# Patient Record
Sex: MALE | Race: WHITE
[De-identification: names, ages, dates, MRNs, and addresses within clinical notes are randomized per-mention and may not be internally consistent; named-entity substitution may affect disease eponyms.]

---

## 2018-02-05 ENCOUNTER — HOSPITAL ENCOUNTER (INPATIENT)
Dept: HOSPITAL 12 - SRC | Age: 23
LOS: 6 days | Discharge: OTHER INSTITUTION | DRG: 895 | End: 2018-02-11
Attending: INTERNAL MEDICINE | Admitting: INTERNAL MEDICINE
Payer: COMMERCIAL

## 2018-02-05 VITALS — HEIGHT: 68 IN | BODY MASS INDEX: 22.73 KG/M2 | WEIGHT: 150 LBS

## 2018-02-05 DIAGNOSIS — Z81.1: ICD-10-CM

## 2018-02-05 DIAGNOSIS — Z83.2: ICD-10-CM

## 2018-02-05 DIAGNOSIS — F10.230: Primary | ICD-10-CM
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
## 2018-02-05 DIAGNOSIS — F13.10: ICD-10-CM

## 2018-02-05 DIAGNOSIS — Z82.49: ICD-10-CM

## 2018-02-05 DIAGNOSIS — F41.9: ICD-10-CM

## 2018-02-05 DIAGNOSIS — F17.210: ICD-10-CM

## 2018-02-05 DIAGNOSIS — D58.0: ICD-10-CM

## 2018-02-05 DIAGNOSIS — R17: ICD-10-CM

## 2018-02-05 DIAGNOSIS — I15.9: ICD-10-CM

## 2018-02-05 DIAGNOSIS — R73.9: ICD-10-CM

## 2018-02-05 DIAGNOSIS — F14.10: ICD-10-CM

## 2018-02-05 PROCEDURE — G0480 DRUG TEST DEF 1-7 CLASSES: HCPCS

## 2018-02-06 VITALS — SYSTOLIC BLOOD PRESSURE: 148 MMHG | DIASTOLIC BLOOD PRESSURE: 71 MMHG

## 2018-02-06 VITALS — DIASTOLIC BLOOD PRESSURE: 65 MMHG | SYSTOLIC BLOOD PRESSURE: 138 MMHG

## 2018-02-06 VITALS — SYSTOLIC BLOOD PRESSURE: 132 MMHG | DIASTOLIC BLOOD PRESSURE: 74 MMHG

## 2018-02-06 LAB
ALP SERPL-CCNC: 65 U/L (ref 50–136)
ALT SERPL W/O P-5'-P-CCNC: 35 U/L (ref 16–63)
AMPHETAMINES UR QL SCN>1000 NG/ML: NEGATIVE
AMYLASE SERPL-CCNC: 54 U/L (ref 25–115)
AST SERPL-CCNC: 27 U/L (ref 15–37)
BARBITURATES UR QL SCN: NEGATIVE
BASOPHILS # BLD AUTO: 0.1 K/UL (ref 0–8)
BASOPHILS NFR BLD AUTO: 0.8 % (ref 0–2)
BILIRUB SERPL-MCNC: 2.6 MG/DL (ref 0.2–1)
BUN SERPL-MCNC: 14 MG/DL (ref 7–18)
CHLORIDE SERPL-SCNC: 99 MMOL/L (ref 98–107)
CO2 SERPL-SCNC: 28 MMOL/L (ref 21–32)
COCAINE UR QL SCN: POSITIVE
CREAT SERPL-MCNC: 1.1 MG/DL (ref 0.6–1.3)
EOSINOPHIL # BLD AUTO: 0.1 K/UL (ref 0–0.7)
EOSINOPHIL NFR BLD AUTO: 1 % (ref 0–7)
ETHANOL SERPL-MCNC: < 3 MG/DL (ref 0–0)
GLUCOSE SERPL-MCNC: 122 MG/DL (ref 74–106)
HCT VFR BLD AUTO: 45.6 % (ref 36.7–47.1)
HGB BLD-MCNC: 17 G/DL (ref 12.5–16.3)
LYMPHOCYTES # BLD AUTO: 1.5 K/UL (ref 20–40)
LYMPHOCYTES NFR BLD AUTO: 18.1 % (ref 20.5–51.5)
MAGNESIUM SERPL-MCNC: 2.1 MG/DL (ref 1.8–2.4)
MCV RBC AUTO: 96.9 FL (ref 73–96.2)
MONOCYTES # BLD AUTO: 0.5 K/UL (ref 2–10)
MONOCYTES NFR BLD AUTO: 6.2 % (ref 0–11)
NEUTROPHILS # BLD AUTO: 5.9 K/UL (ref 1.8–8.9)
NEUTROPHILS NFR BLD AUTO: 73.9 % (ref 38.5–71.5)
OPIATES UR QL SCN: NEGATIVE
PCP UR QL SCN>25 NG/ML: NEGATIVE
PLATELET # BLD AUTO: 219 K/UL (ref 152–348)
POTASSIUM SERPL-SCNC: 3.8 MMOL/L (ref 3.5–5.1)
RBC # BLD AUTO: 4.7 MIL/UL (ref 4.06–5.63)
THC UR QL SCN>50 NG/ML: POSITIVE
WBC # BLD AUTO: 8 K/UL (ref 3.6–10.2)
WS STN SPEC: 8.2 G/DL (ref 6.4–8.2)

## 2018-02-06 PROCEDURE — HZ41ZZZ GROUP COUNSELING FOR SUBSTANCE ABUSE TREATMENT, BEHAVIORAL: ICD-10-PCS

## 2018-02-06 PROCEDURE — HZ2ZZZZ DETOXIFICATION SERVICES FOR SUBSTANCE ABUSE TREATMENT: ICD-10-PCS | Performed by: INTERNAL MEDICINE

## 2018-02-06 RX ADMIN — LORAZEPAM SCH MG: 1 TABLET ORAL at 20:16

## 2018-02-06 RX ADMIN — LORAZEPAM SCH MG: 1 TABLET ORAL at 16:33

## 2018-02-06 NOTE — NUR
END OF SHIFT NOTE

Patient is alert oriented x4. 22 year old male admitted for ETOH dependence. Patient has PMH 
of Anxiety, seizure, and hereditary spherocytosis. Patient cont on 5 days Ativan taper 
tolerating well. Medications were effective in reducing withdrawal symptoms. Patient did not 
receive any PRN during shift. Skin intact warm and dry to touch. Last CIWA score was 10. 
Patient remained compliant with treatment plan and medication regime. Encourage Po fluids as 
tolerated. All safety measures in place, Call light within reach. Patient endorsed to night 
nurse in stable condition.

## 2018-02-06 NOTE — NUR
ADMISSION NOTE

STATUS-FULL CODE

ALLERGY-SULFA

HEIGHT-5'8"

WEIGHT-150IBS

VITAL SIGNS-BP: 138/65, P: 80, R: 16, O2: 96%, T: 98.4, PA: 0/10

PCP-NONE



Patient is a 22 year old  male admitted on 02/06/18 for ETOH dependence, arrived on the unit 
at 1500. Patient able to provide UDS during intake. Skin and body check completed. Pt is 
full code, Regular diet and on fall/seizure precautions. Patient reports PMH of 
anxiety,heredity spherocytosis, seizure last seizure was in May 2017. Pt refuses 
flu/pneumonia vaccine. Patient reports smokes 1 pack of cigarettes a day. Patient did not 
bring any medication with him. Patient reports first using alcohol at age 11 years old, but 
last 2 years drinking daily 12 pack of beer, last used was on 02/05/18 12 pack beer, Cocaine 
last 2 years 0.25 GM daily via inhalation last used was 0.25Gm on 02/05/18. Patient also 
reported using Xanax last 2 years on and off occasionally 0.25 mg to 2 mg last used was 2mg 
prior to admission 02/06/18. Patient reports achieved 2 weeks of sobriety 2 years ago. 
Patient reports this is his first time in treatment/detox. Patient reports family history of 
ETOH abuse and heart disease. 



Upon assessment, pt is AAOx4 and presents with anxiety, agitation,fatigue. Respirations even 
and unlabored. Denies SOB or chest pain. Bowel sounds active x 4, abdomen soft. PERRLA. Skin 
intact, no open wounds noted. Pt denies SI/HI at this time. Patient denies any history of 
SI/HI. Pt oriented to room and encouraged to notify staff with any concerns. Safety measures 
in place. Call light within reach, side rails up x 2, bed locked and in low position. Will 
continue to monitor.

## 2018-02-06 NOTE — NUR
INTAKE ASSESSMENT

Received patient in intake. He is AOX4, stable, and ambulatory. Vital signs WNL. Patient 
reports allergic to sulfa. Patient has seizure history last seizure was in May 2017. Patient 
did not bring any medications with him from home. Explained unit protocols and patient 
verbalized understanding. Will admit patient upon admission to third floor.

## 2018-02-06 NOTE — NUR
Start of Shift

Patient Received. Patient is in his room, awake, alert and verbally responsive. Breathing 
even and non labored. Patient is a 22 year old male admitted today for ETOH Dependence. 
Patient was started on a 5 day Ativan taper with CIWA noted to be 10. Per endorsement, labs 
to be rendered. All needs attended to promptly. Will continue plan of care as ordered.

## 2018-02-06 NOTE — NUR
PRN Medication Administration 

Patient is noted verbalizing inability of falling asleep and staying asleep. PRN Benadryl 
administered with routine medications. Will continue to monitor.

## 2018-02-07 VITALS — SYSTOLIC BLOOD PRESSURE: 143 MMHG | DIASTOLIC BLOOD PRESSURE: 85 MMHG

## 2018-02-07 VITALS — SYSTOLIC BLOOD PRESSURE: 115 MMHG | DIASTOLIC BLOOD PRESSURE: 55 MMHG

## 2018-02-07 VITALS — SYSTOLIC BLOOD PRESSURE: 120 MMHG | DIASTOLIC BLOOD PRESSURE: 65 MMHG

## 2018-02-07 VITALS — SYSTOLIC BLOOD PRESSURE: 133 MMHG | DIASTOLIC BLOOD PRESSURE: 76 MMHG

## 2018-02-07 VITALS — SYSTOLIC BLOOD PRESSURE: 138 MMHG | DIASTOLIC BLOOD PRESSURE: 93 MMHG

## 2018-02-07 VITALS — SYSTOLIC BLOOD PRESSURE: 112 MMHG | DIASTOLIC BLOOD PRESSURE: 59 MMHG

## 2018-02-07 VITALS — DIASTOLIC BLOOD PRESSURE: 88 MMHG | SYSTOLIC BLOOD PRESSURE: 151 MMHG

## 2018-02-07 LAB
MCH RBC QN AUTO: 34.7 UUG (ref 23.8–33.4)
MCHC RBC AUTO-ENTMCNC: 35 G/DL (ref 32.5–36.3)

## 2018-02-07 RX ADMIN — THERA TABS SCH UDTAB: TAB at 08:55

## 2018-02-07 RX ADMIN — LORAZEPAM SCH MG: 1 TABLET ORAL at 14:25

## 2018-02-07 RX ADMIN — LORAZEPAM SCH MG: 1 TABLET ORAL at 08:56

## 2018-02-07 RX ADMIN — LORAZEPAM SCH MG: 1 TABLET ORAL at 21:30

## 2018-02-07 RX ADMIN — FOLIC ACID SCH MG: 1 TABLET ORAL at 08:55

## 2018-02-07 RX ADMIN — Medication SCH MG: at 08:55

## 2018-02-07 NOTE — NUR
BEGINNING OF SHIFT

Patient endorsement report received from night shift nurse, all pertinent information 
discussed. Patient is a 22 year old male with admitting Dx: ETOH dependence. with substance 
use of: Cocaine, and xanax. Patient currently with ongoing 5 day Ativan taper as ordered, 
and is scheduled to begin day 1 of taper. Per night shift patient with last ciwa score of: 
10, received PRN: Benadryl. Slept for 8 hours. Fall and seizure precautions observed at all 
times. Patient received awake, alert and oriented x4, educated regarding plan of care for 
the day, will continue to monitor closely. safety measures in place.

## 2018-02-07 NOTE — NUR
End of Shift 

Patient is in bed sleeping. Breathing even and non labored. No signs of restlessness or 
discomfort noted. Patient is a 22 year old male admitted for ETOH Dependence and continues 
on a 5 day Ativan taper. Patient received a PRN Benadryl with medication noted to be 
effective. Last noted CIWA 10. All needs attended to promptly. Will endorse to continue plan 
of care as ordered.

## 2018-02-07 NOTE — NUR
Start of Shift Notes

Received 21 y/o male meghana standing inside his room, px made no complaints, just reported that 
his anxiety is mild. Px is admitted on 02/06/2018 for ETOH and cocaine dependence. Px is 
Full Code, on regular diet and px has allergies on Sulfa drugs. Px reported PMHx of anxiety 
and hereditary spherocytosis. Px is placed on 5 day Ativan taper. Px is on seizure and fall 
precautions. Bed on lowest position, side rails up 2x, and call light within reach.

## 2018-02-07 NOTE — NUR
PRN Benadryl

Px requested for Benadryl to help him sleep tonight. Benadryl 50 mg/cap, 1 cap given. We'll 
continue to monitor.

## 2018-02-07 NOTE — NUR
END OF SHIFT

Patient alert and oriented x4, compliant with plan of care. Patient with admitting Dx: etoh 
withdrawal. Continues under very close observation for any s/sx of withdrawal. Continues on 
5 day Ativan taper as ordered, currently on day 1 of taper.  Vital signs monitored closely 
during shift. During shift patient presented with: tremors, sweats, anxiety, agitation, 
clammy skin. Also noted with worried facial expression and avoidant eye contact. Patient 
noted with irritable and anxious facial expression. During shift patient encouraged to 
develop coping skills and utilization of non pharmacological interventions provided. 
Encouraged to participate in therapy session. Encouraged diversional activities to alleviate 
anxiety. 0900 CIWA: 8; 1300 CIWA: 7; 1700 CIWA: 7. Denies any SI/HI. Noted with good 
appetite during shift, encouraged adequate PO fluid intake as tolerated. All needs met and 
rendered, call light kept within reach. Fall and seizure precautions observed and in place 
at all times. Patient endorsed to night shift nurse, all pertinent information discussed.

## 2018-02-08 VITALS — DIASTOLIC BLOOD PRESSURE: 79 MMHG | SYSTOLIC BLOOD PRESSURE: 111 MMHG

## 2018-02-08 VITALS — DIASTOLIC BLOOD PRESSURE: 83 MMHG | SYSTOLIC BLOOD PRESSURE: 140 MMHG

## 2018-02-08 VITALS — DIASTOLIC BLOOD PRESSURE: 72 MMHG | SYSTOLIC BLOOD PRESSURE: 121 MMHG

## 2018-02-08 VITALS — SYSTOLIC BLOOD PRESSURE: 128 MMHG | DIASTOLIC BLOOD PRESSURE: 77 MMHG

## 2018-02-08 VITALS — SYSTOLIC BLOOD PRESSURE: 128 MMHG | DIASTOLIC BLOOD PRESSURE: 74 MMHG

## 2018-02-08 VITALS — DIASTOLIC BLOOD PRESSURE: 71 MMHG | SYSTOLIC BLOOD PRESSURE: 125 MMHG

## 2018-02-08 LAB
BASOPHILS # BLD AUTO: 0 K/UL (ref 0–8)
BASOPHILS NFR BLD AUTO: 0.3 % (ref 0–2)
BILIRUB DIRECT SERPL-MCNC: 0.3 MG/DL (ref 0–0.2)
BILIRUB SERPL-MCNC: 2.5 MG/DL (ref 0.2–1)
BUN SERPL-MCNC: 13 MG/DL (ref 7–18)
CHLORIDE SERPL-SCNC: 104 MMOL/L (ref 98–107)
CO2 SERPL-SCNC: 29 MMOL/L (ref 21–32)
CREAT SERPL-MCNC: 1.1 MG/DL (ref 0.6–1.3)
EOSINOPHIL # BLD AUTO: 0.1 K/UL (ref 0–0.7)
EOSINOPHIL NFR BLD AUTO: 1.1 % (ref 0–7)
GLUCOSE SERPL-MCNC: 92 MG/DL (ref 74–106)
HCT VFR BLD AUTO: 46.1 % (ref 36.7–47.1)
HGB BLD-MCNC: 16.8 G/DL (ref 12.5–16.3)
LYMPHOCYTES # BLD AUTO: 2.1 K/UL (ref 20–40)
LYMPHOCYTES NFR BLD AUTO: 30.4 % (ref 20.5–51.5)
MAGNESIUM SERPL-MCNC: 2.6 MG/DL (ref 1.8–2.4)
MCH RBC QN AUTO: 35.2 UUG (ref 23.8–33.4)
MCHC RBC AUTO-ENTMCNC: 36 G/DL (ref 32.5–36.3)
MCV RBC AUTO: 96.6 FL (ref 73–96.2)
MONOCYTES # BLD AUTO: 0.5 K/UL (ref 2–10)
MONOCYTES NFR BLD AUTO: 7.4 % (ref 0–11)
NEUTROPHILS # BLD AUTO: 4.2 K/UL (ref 1.8–8.9)
NEUTROPHILS NFR BLD AUTO: 60.8 % (ref 38.5–71.5)
PLATELET # BLD AUTO: 200 K/UL (ref 152–348)
POTASSIUM SERPL-SCNC: 4 MMOL/L (ref 3.5–5.1)
RBC # BLD AUTO: 4.78 MIL/UL (ref 4.06–5.63)
WBC # BLD AUTO: 7 K/UL (ref 3.6–10.2)

## 2018-02-08 RX ADMIN — THERA TABS SCH UDTAB: TAB at 09:03

## 2018-02-08 RX ADMIN — Medication SCH MG: at 09:03

## 2018-02-08 RX ADMIN — CLONIDINE HYDROCHLORIDE PRN MG: 0.1 TABLET ORAL at 20:59

## 2018-02-08 RX ADMIN — FOLIC ACID SCH MG: 1 TABLET ORAL at 09:03

## 2018-02-08 RX ADMIN — LORAZEPAM SCH MG: 1 TABLET ORAL at 15:01

## 2018-02-08 RX ADMIN — LORAZEPAM SCH MG: 1 TABLET ORAL at 09:02

## 2018-02-08 NOTE — NUR
END OF SHIFT



Patient is 22 year old male admitted for medically supervised withdrawal from alcohol. 
Patient is full code with allergy to sulfa.  Most recent CIWA  6. Patient noted to have 
tremors and anxiety. Patient appears guarded with poor eye contact. Patient minimizes his 
anxiety and tremors and states he has had his tremors for a long time.  Attended group 
activity this afternoon RN night shift will continue to monitor patient.

## 2018-02-08 NOTE — NUR
PRN meds

Px appears very anxious and asked for Benadryl to help him sleep tonight. At 2059, Clonidine 
0.1 mg/tab, 1 tab given PO as PRN med and Benadryl 50 mg/cap, 1 cap given PO as PRN med. 
We'll continue to monitor.

## 2018-02-08 NOTE — NUR
End of Shift Notes

During the shift, px didn't complained of anything just requested for Benadryl to help him 
sleep. Benadryl 50 mg/cap, 1 cap given PO as PRN med and it was effective. Oral intake of 1 
L, voided 2x, BM 1x. Slept for 6 hours. We'll continue to monitor. Endorsed to AM shift 
nurse.

## 2018-02-08 NOTE — NUR
START OF SHIFT



Received report from night shift nurse. Patient is 22 year old male admitted for medically 
supervised withdrawal from alcohol. Patient is full code with allergy to sulfa.  On 
assessment this AM:  CIWA: 3. Denies SOB, chest pain. Patient's vitals signs: WNL. Patient 
reports anxiety and mild headache.   Compliant with AM meds. Offered prn but patient 
declined at this time. No PRN given.  Patient has steady gait. Encouraged to attend group 
meetings today. Will continue to monitor patient.

## 2018-02-08 NOTE — NUR
CIWA deferred

CIWA deferred at 0000 and 0400 due to the px is asleep, to assess if the px is awake per 
doctors order. We'll continue to monitor.

## 2018-02-09 VITALS — SYSTOLIC BLOOD PRESSURE: 124 MMHG | DIASTOLIC BLOOD PRESSURE: 66 MMHG

## 2018-02-09 VITALS — DIASTOLIC BLOOD PRESSURE: 71 MMHG | SYSTOLIC BLOOD PRESSURE: 128 MMHG

## 2018-02-09 VITALS — DIASTOLIC BLOOD PRESSURE: 62 MMHG | SYSTOLIC BLOOD PRESSURE: 104 MMHG

## 2018-02-09 VITALS — DIASTOLIC BLOOD PRESSURE: 85 MMHG | SYSTOLIC BLOOD PRESSURE: 136 MMHG

## 2018-02-09 VITALS — DIASTOLIC BLOOD PRESSURE: 80 MMHG | SYSTOLIC BLOOD PRESSURE: 131 MMHG

## 2018-02-09 VITALS — DIASTOLIC BLOOD PRESSURE: 62 MMHG | SYSTOLIC BLOOD PRESSURE: 128 MMHG

## 2018-02-09 VITALS — DIASTOLIC BLOOD PRESSURE: 81 MMHG | SYSTOLIC BLOOD PRESSURE: 130 MMHG

## 2018-02-09 VITALS — SYSTOLIC BLOOD PRESSURE: 124 MMHG | DIASTOLIC BLOOD PRESSURE: 70 MMHG

## 2018-02-09 PROCEDURE — HZ31ZZZ INDIVIDUAL COUNSELING FOR SUBSTANCE ABUSE TREATMENT, BEHAVIORAL: ICD-10-PCS

## 2018-02-09 RX ADMIN — LORAZEPAM SCH MG: 1 TABLET ORAL at 09:52

## 2018-02-09 RX ADMIN — Medication SCH MG: at 09:52

## 2018-02-09 RX ADMIN — LORAZEPAM SCH MG: 1 TABLET ORAL at 21:42

## 2018-02-09 RX ADMIN — FOLIC ACID SCH MG: 1 TABLET ORAL at 09:52

## 2018-02-09 RX ADMIN — CLONIDINE HYDROCHLORIDE PRN MG: 0.1 TABLET ORAL at 19:59

## 2018-02-09 RX ADMIN — THERA TABS SCH UDTAB: TAB at 09:52

## 2018-02-09 RX ADMIN — CLONIDINE HYDROCHLORIDE PRN MG: 0.1 TABLET ORAL at 05:11

## 2018-02-09 NOTE — NUR
CIWA deferred 

CIWA deferred due to px is asleep, to assess if the px is awake per doctor's order. We'll 
continue to monitor.

## 2018-02-09 NOTE — NUR
Reassessment of anxiety and tremors

Px's anxiety and tremors improved after an hour of administration of PRN Clonidine and 
Ativan 1 mg PO as standing order. CIWA 8. We'll continue to monitor.

## 2018-02-09 NOTE — NUR
PRN Clonidine

Px woke up before 0500 and verbalized "I got broken hours of sleep, I was not like this for 
the past nights. Can I go smoke?" CIWA 12 at this moment. Clonidine 0.1 mg/tab, 1 tab given 
PO as PRN med for anxiety. BP= 136/70. We'll continue to monitor.

## 2018-02-09 NOTE — NUR
End of Shift Notes

Pxs CIWA improved from start of shift CIWA 14 to CIWA 11 at 0000. PRN meds Clonidine 0.1 mg 
was effective to decrease pxs anxiety. PRN Benadryl 50 mg was not so effective due to the 
verbalization of px that he had broken hours of sleep last night. CIWA was 12 at 0500. At 
0511, another Clonidine 0.1 mg given PO with BP= 136/70. Px had oral intake of 1 L, voided 
2x, No BM. Px slept for 5 hours. At 0630, px was asleep on bed. Px is on seizure and fall 
precautions. Bed on lowest position, side rails up 2x, and call light within reach. Px 
endorsed to AM shift nurse.

## 2018-02-09 NOTE — NUR
Start of Shift Notes

Received px awake standing inside his room. Unfolded clothes noticed on top of cabinet and 
chair. Px appears worried. Px verbalized his anxiety is 4/10 and complained about his 
bilateral tremors. Px stated "I want to take my Ativan later at night not earlier and if I 
can have Benadryl too". Px was advised to call anytime if he needs anything. We'll continue 
to monitor.

## 2018-02-09 NOTE — NUR
START OF SHIFT



Pt 23 y/o male admitted for medically supervised withdrawal. Pt received in room on bed with 
eyes closed resting, but easily arousable to name. Pt alert and oriented to name, place, and 
time.  Perrla. Skin warm and moist to touch. Respirations even and unlabored. Bilateral hand 
tremors noted. Clothes scattered on the floor in room. Pt appears disheveled. Last reported 
ciwa=12 @ 0400. It was reported that pt slept for 5 hours last night. Bed on lowest position 
with side rails x2 up for safety. Call light within reach.

## 2018-02-09 NOTE — NUR
PRN Meds

At 1959, Clonidine 0.1 mg/tab, 1 tab given PO as PRN med for anxiety, BP= 136/85 mmHg. At 
2142, Benadryl 50 mg/cap, 1 cap given PO to help px sleep. We'll continue to monitor.

## 2018-02-09 NOTE — NUR
END OF SHIFT



Pt 21 y/o male admitted for medically supervised withdrawal. Pt appears disheveled. Pt with 
clothes scattered throughout the floor noted. Pt alert and oriented to name, place, and 
time.  Perrla. Skin warm and moist to touch. Respirations even and unlabored. Bilateral hand 
tremors noted. CIWA = 9@0800,9@1200, 9@1600. Pt observed mostly in room throughout the 
morning. Pt attended group activity. Pt was seen by MD today. Pt medication compliant and 
tolerated well. No ASE noted. Pt is on a 5 day ativan taper is on day 4. Bed on lowest 
position with side rails x2 up for safety. Call light within reach.

## 2018-02-10 VITALS — SYSTOLIC BLOOD PRESSURE: 136 MMHG | DIASTOLIC BLOOD PRESSURE: 75 MMHG

## 2018-02-10 VITALS — SYSTOLIC BLOOD PRESSURE: 125 MMHG | DIASTOLIC BLOOD PRESSURE: 79 MMHG

## 2018-02-10 VITALS — DIASTOLIC BLOOD PRESSURE: 83 MMHG | SYSTOLIC BLOOD PRESSURE: 128 MMHG

## 2018-02-10 VITALS — DIASTOLIC BLOOD PRESSURE: 62 MMHG | SYSTOLIC BLOOD PRESSURE: 124 MMHG

## 2018-02-10 VITALS — SYSTOLIC BLOOD PRESSURE: 111 MMHG | DIASTOLIC BLOOD PRESSURE: 65 MMHG

## 2018-02-10 VITALS — SYSTOLIC BLOOD PRESSURE: 135 MMHG | DIASTOLIC BLOOD PRESSURE: 79 MMHG

## 2018-02-10 RX ADMIN — GABAPENTIN SCH MG: 100 CAPSULE ORAL at 21:00

## 2018-02-10 RX ADMIN — THERA TABS SCH UDTAB: TAB at 08:26

## 2018-02-10 RX ADMIN — FOLIC ACID SCH MG: 1 TABLET ORAL at 08:26

## 2018-02-10 RX ADMIN — Medication SCH MG: at 08:26

## 2018-02-10 NOTE — NUR
CIWA deferred

CIWA deferred at 0000 and 0400 due to the px is asleep, to assess if the px is awake per 
doctor's order. We'll continue to monitor.

## 2018-02-10 NOTE — NUR
END OF SHIFT



Pt 23 y/o male admitted for medically supervised withdrawal. Pt appears disheveled with hair 
uncombed. Pt with clothes scattered throughout the floor. Pt alert and oriented to name, 
place, and time. Perrla. Skin warm and moist to touch. Respirations even and unlabored. 
Bilateral hand tremors noted. CIWA = 8@0800,8@1200,and 8@1600. Pt observed mostly in dining 
room throughout the morning. Pt attended group activity. Pt was seen by MD today. Pt 
medication compliant and tolerated well. No ASE noted. Pt is on a 5 day ativan taper is on 
day 5. Bed on lowest position with side rails x2 up for safety. Call light within reach.

## 2018-02-10 NOTE — NUR
PRN



Pt restless, not able to sit still , and with pressured speech noted. Vistaril po prn per MD 
order given and tolerated well.

## 2018-02-10 NOTE — NUR
End of Shift Notes

During the shift, At 1959, Clonidine 0.1 mg/tab, 1 tab given PO as PRN med for anxiety, it 
was effective. At 2142, Benadryl 50 mg/cap, 1 cap given PO as PRN med, it was effective. Px 
slept for 6.5 hours. Pxs oral intake is 1 L, voided 3x, No BM.  Last CIWA 8. At 0630, px 
left sleeping on bed. We'll continue to monitor. Px endorsed to AM shift nurse.

## 2018-02-10 NOTE — NUR
START OF SHIFT



Pt 21 y/o male admitted for medically supervised withdrawal. Pt received in room on bed with 
eyes closed resting, but easily arousable to name. Pt alert and oriented to name, place, and 
time.  Perrla. Skin warm and moist to touch. Respirations even and unlabored. Bilateral hand 
tremors noted. Pt appears disheveled. Open empty bottles of drinks noted scattered 
throughout bed side shelf. Pt is on a 5 day ativan taper and is on day 5. Last reported 
ciwa=8 @ 0400. It was reported that pt slept for 6 hours last night. Bed on lowest position 
with side rails x2 up for safety. Call light within reach.

## 2018-02-10 NOTE — NUR
Start of Shift Notes

Received awake standing in his room. Dirty clothes noted on floor and chair. Px stated that 
his anxiety is mild and he is ready to be D/C tomorrow 02/11/2018. VS as follows BP= 135/79, 
NM= 75, RR=18, T= 97.7, O2sat= 87%. We'll continue to monitor.

## 2018-02-11 VITALS — DIASTOLIC BLOOD PRESSURE: 69 MMHG | SYSTOLIC BLOOD PRESSURE: 117 MMHG

## 2018-02-11 VITALS — DIASTOLIC BLOOD PRESSURE: 73 MMHG | SYSTOLIC BLOOD PRESSURE: 131 MMHG

## 2018-02-11 VITALS — SYSTOLIC BLOOD PRESSURE: 129 MMHG | DIASTOLIC BLOOD PRESSURE: 71 MMHG

## 2018-02-11 RX ADMIN — FOLIC ACID SCH MG: 1 TABLET ORAL at 08:27

## 2018-02-11 RX ADMIN — Medication SCH MG: at 08:27

## 2018-02-11 RX ADMIN — THERA TABS SCH UDTAB: TAB at 08:26

## 2018-02-11 RX ADMIN — GABAPENTIN SCH MG: 100 CAPSULE ORAL at 08:27

## 2018-02-11 NOTE — NUR
START OF SHIFT



RECEIVED PT IN ROOM, PT STATES HE IS GETTING READY FOR DISCHARGE. PT REPORTS HAVING ANXIETY 
ABOUT LEAVING DETOX. PT IS TO BE DISCHARGED TODAY. ALL SAFETY MEASURES IN PLACE. WILL 
CONTINUE TO MONITOR UNTIL DISCHARGE.

## 2018-02-11 NOTE — NUR
DISCHARGE NOTE



PT IS IN STABLE CONDITION AND IS A/OX4, RESPIRATIONS EVEN AND UNLABORED. D/C INSTRUCTIONS 
GIVEN AND PT VERBALIZED UNDERSTANDING. D/C PAPERWORK DATED, SIGNED AND COPIED. PT LEFT THE 
BUILDING AT 0951 ON 02/11/18 WITH ALL BELONGINGS, PRESCRIPTIONS, AND D/C PAPERWORK. PT DID 
NOT HAVE HOME MEDS. PT BELONGINGS SUCH AS TOILETRIES HAS BEEN PUT INSIDE OF HIS BAG. PT HAS 
BEEN PICKED UP BY "[employer]IVATE  AND HAS BEEN TAKEN TO [facility].

## 2018-02-11 NOTE — NUR
End of Shift Notes

Px is to be D/C today, 02/11/2018. Benadryl 50 mg/cap, 1 cap given PO as PRN med to help px 
sleep through the night and it was effective. Px slept for 6 hours. Px oral intake 1000 ml, 
voided 2x, No BM. Left asleep on bed in left side lying position. Last CIWA 6. Bed on lowest 
position, side rails up 2x, and call light within reach. We'll continue to monitor. Endorse 
to AM shift nurse.

## 2019-01-12 NOTE — NUR
Start of Shift Notes

Received awake standing inside his room. Room appears unkempt, clothes on the floor and some 
garbage. Px has flushed face and appears anxious, fidgety and restless. Bilateral hand 
tremors noted. Px asked for a pass to smoke in the patio. Px is on seizure and fall 
precautions. Bed on lowest position, side rails up 2x, and call light within reach. We'll 
continue to monitor. ambulatory

## 2019-05-17 NOTE — NUR
PRN Benadryl

Px asked for Benadryl to help him sleep. Benadryl 50 mg/cap, 1 cap given PO as PRN med. 
We'll continue to monitor. posterior